# Patient Record
Sex: MALE | URBAN - METROPOLITAN AREA
[De-identification: names, ages, dates, MRNs, and addresses within clinical notes are randomized per-mention and may not be internally consistent; named-entity substitution may affect disease eponyms.]

---

## 2022-09-26 ENCOUNTER — APPOINTMENT (OUTPATIENT)
Dept: URBAN - METROPOLITAN AREA CLINIC 256 | Age: 87
Setting detail: DERMATOLOGY
End: 2022-09-26

## 2022-09-26 DIAGNOSIS — L57.0 ACTINIC KERATOSIS: ICD-10-CM

## 2022-09-26 DIAGNOSIS — D22 MELANOCYTIC NEVI: ICD-10-CM

## 2022-09-26 DIAGNOSIS — Z71.89 OTHER SPECIFIED COUNSELING: ICD-10-CM

## 2022-09-26 DIAGNOSIS — L82.1 OTHER SEBORRHEIC KERATOSIS: ICD-10-CM

## 2022-09-26 DIAGNOSIS — L57.8 OTHER SKIN CHANGES DUE TO CHRONIC EXPOSURE TO NONIONIZING RADIATION: ICD-10-CM

## 2022-09-26 DIAGNOSIS — D485 NEOPLASM OF UNCERTAIN BEHAVIOR OF SKIN: ICD-10-CM

## 2022-09-26 PROBLEM — D48.5 NEOPLASM OF UNCERTAIN BEHAVIOR OF SKIN: Status: ACTIVE | Noted: 2022-09-26

## 2022-09-26 PROBLEM — D22.5 MELANOCYTIC NEVI OF TRUNK: Status: ACTIVE | Noted: 2022-09-26

## 2022-09-26 PROCEDURE — OTHER COUNSELING: OTHER

## 2022-09-26 PROCEDURE — 17004 DESTROY PREMAL LESIONS 15/>: CPT

## 2022-09-26 PROCEDURE — OTHER BIOPSY BY SHAVE METHOD: OTHER

## 2022-09-26 PROCEDURE — 99213 OFFICE O/P EST LOW 20 MIN: CPT | Mod: 25

## 2022-09-26 PROCEDURE — OTHER MIPS QUALITY: OTHER

## 2022-09-26 PROCEDURE — 11102 TANGNTL BX SKIN SINGLE LES: CPT | Mod: 59

## 2022-09-26 PROCEDURE — OTHER LIQUID NITROGEN: OTHER

## 2022-09-26 ASSESSMENT — LOCATION ZONE DERM
LOCATION ZONE: SCALP
LOCATION ZONE: NECK
LOCATION ZONE: TRUNK
LOCATION ZONE: HAND
LOCATION ZONE: ARM
LOCATION ZONE: FACE

## 2022-09-26 ASSESSMENT — LOCATION DETAILED DESCRIPTION DERM
LOCATION DETAILED: LEFT RADIAL DORSAL HAND
LOCATION DETAILED: LEFT INFERIOR CENTRAL MALAR CHEEK
LOCATION DETAILED: RIGHT ULNAR DORSAL HAND
LOCATION DETAILED: RIGHT PROXIMAL POSTERIOR UPPER ARM
LOCATION DETAILED: RIGHT POSTERIOR SHOULDER
LOCATION DETAILED: LEFT CENTRAL FRONTAL SCALP
LOCATION DETAILED: LEFT CLAVICULAR NECK
LOCATION DETAILED: RIGHT CENTRAL FRONTAL SCALP
LOCATION DETAILED: RIGHT MID-UPPER BACK
LOCATION DETAILED: RIGHT SUPERIOR PARIETAL SCALP
LOCATION DETAILED: LEFT PROXIMAL POSTERIOR UPPER ARM
LOCATION DETAILED: POSTERIOR MID-PARIETAL SCALP
LOCATION DETAILED: LEFT SUPERIOR LATERAL UPPER BACK
LOCATION DETAILED: RIGHT INFERIOR CENTRAL MALAR CHEEK
LOCATION DETAILED: RIGHT RADIAL DORSAL HAND
LOCATION DETAILED: LEFT SUPERIOR UPPER BACK
LOCATION DETAILED: RIGHT ANTERIOR PROXIMAL UPPER ARM
LOCATION DETAILED: LEFT POSTERIOR SHOULDER
LOCATION DETAILED: RIGHT MEDIAL SUPERIOR CHEST
LOCATION DETAILED: RIGHT INFERIOR MEDIAL UPPER BACK
LOCATION DETAILED: LEFT SUPERIOR CENTRAL MALAR CHEEK
LOCATION DETAILED: LEFT MEDIAL SUPERIOR CHEST
LOCATION DETAILED: RIGHT SUPERIOR CENTRAL MALAR CHEEK

## 2022-09-26 ASSESSMENT — LOCATION SIMPLE DESCRIPTION DERM
LOCATION SIMPLE: RIGHT HAND
LOCATION SIMPLE: RIGHT SCALP
LOCATION SIMPLE: LEFT CHEEK
LOCATION SIMPLE: RIGHT UPPER ARM
LOCATION SIMPLE: LEFT UPPER ARM
LOCATION SIMPLE: LEFT SCALP
LOCATION SIMPLE: CHEST
LOCATION SIMPLE: RIGHT UPPER BACK
LOCATION SIMPLE: RIGHT CHEEK
LOCATION SIMPLE: LEFT HAND
LOCATION SIMPLE: POSTERIOR SCALP
LOCATION SIMPLE: RIGHT SHOULDER
LOCATION SIMPLE: SCALP
LOCATION SIMPLE: LEFT UPPER BACK
LOCATION SIMPLE: LEFT SHOULDER
LOCATION SIMPLE: LEFT ANTERIOR NECK

## 2022-10-04 ENCOUNTER — APPOINTMENT (OUTPATIENT)
Dept: URBAN - METROPOLITAN AREA CLINIC 256 | Age: 87
Setting detail: DERMATOLOGY
End: 2022-10-05

## 2022-10-04 DIAGNOSIS — Z85.828 PERSONAL HISTORY OF OTHER MALIGNANT NEOPLASM OF SKIN: ICD-10-CM

## 2022-10-04 DIAGNOSIS — Z85.820 PERSONAL HISTORY OF MALIGNANT MELANOMA OF SKIN: ICD-10-CM

## 2022-10-04 PROBLEM — C44.622 SQUAMOUS CELL CARCINOMA OF SKIN OF RIGHT UPPER LIMB, INCLUDING SHOULDER: Status: ACTIVE | Noted: 2022-10-04

## 2022-10-04 PROCEDURE — 17262 DSTRJ MAL LES T/A/L 1.1-2.0: CPT | Mod: 79

## 2022-10-04 PROCEDURE — OTHER CURETTAGE: OTHER

## 2022-10-04 PROCEDURE — OTHER COUNSELING: OTHER

## 2022-10-04 PROCEDURE — OTHER MIPS QUALITY: OTHER

## 2022-10-04 ASSESSMENT — LOCATION ZONE DERM: LOCATION ZONE: TRUNK

## 2022-10-04 ASSESSMENT — LOCATION DETAILED DESCRIPTION DERM
LOCATION DETAILED: LEFT MID-UPPER BACK
LOCATION DETAILED: RIGHT INFERIOR MEDIAL UPPER BACK
LOCATION DETAILED: RIGHT SUPERIOR MEDIAL MIDBACK

## 2022-10-04 ASSESSMENT — LOCATION SIMPLE DESCRIPTION DERM
LOCATION SIMPLE: LEFT UPPER BACK
LOCATION SIMPLE: RIGHT UPPER BACK
LOCATION SIMPLE: RIGHT LOWER BACK

## 2023-03-21 ENCOUNTER — APPOINTMENT (OUTPATIENT)
Dept: URBAN - METROPOLITAN AREA CLINIC 256 | Age: 88
Setting detail: DERMATOLOGY
End: 2023-03-21

## 2023-03-21 VITALS — HEIGHT: 66 IN

## 2023-03-21 DIAGNOSIS — L82.0 INFLAMED SEBORRHEIC KERATOSIS: ICD-10-CM

## 2023-03-21 DIAGNOSIS — D22 MELANOCYTIC NEVI: ICD-10-CM

## 2023-03-21 DIAGNOSIS — D18.0 HEMANGIOMA: ICD-10-CM

## 2023-03-21 DIAGNOSIS — Z71.89 OTHER SPECIFIED COUNSELING: ICD-10-CM

## 2023-03-21 DIAGNOSIS — L82.1 OTHER SEBORRHEIC KERATOSIS: ICD-10-CM

## 2023-03-21 DIAGNOSIS — L57.8 OTHER SKIN CHANGES DUE TO CHRONIC EXPOSURE TO NONIONIZING RADIATION: ICD-10-CM

## 2023-03-21 DIAGNOSIS — L57.0 ACTINIC KERATOSIS: ICD-10-CM

## 2023-03-21 PROBLEM — D18.01 HEMANGIOMA OF SKIN AND SUBCUTANEOUS TISSUE: Status: ACTIVE | Noted: 2023-03-21

## 2023-03-21 PROBLEM — D22.5 MELANOCYTIC NEVI OF TRUNK: Status: ACTIVE | Noted: 2023-03-21

## 2023-03-21 PROCEDURE — OTHER COUNSELING: OTHER

## 2023-03-21 PROCEDURE — 17110 DESTRUCT B9 LESION 1-14: CPT

## 2023-03-21 PROCEDURE — OTHER LIQUID NITROGEN: OTHER

## 2023-03-21 PROCEDURE — OTHER MIPS QUALITY: OTHER

## 2023-03-21 PROCEDURE — 99213 OFFICE O/P EST LOW 20 MIN: CPT | Mod: 25

## 2023-03-21 PROCEDURE — 17003 DESTRUCT PREMALG LES 2-14: CPT | Mod: 59

## 2023-03-21 PROCEDURE — 17000 DESTRUCT PREMALG LESION: CPT | Mod: 59

## 2023-03-21 ASSESSMENT — LOCATION ZONE DERM
LOCATION ZONE: FACE
LOCATION ZONE: ARM
LOCATION ZONE: HAND
LOCATION ZONE: EAR
LOCATION ZONE: LIP
LOCATION ZONE: TRUNK
LOCATION ZONE: SCALP

## 2023-03-21 ASSESSMENT — LOCATION DETAILED DESCRIPTION DERM
LOCATION DETAILED: RIGHT FOREHEAD
LOCATION DETAILED: LEFT POSTERIOR SHOULDER
LOCATION DETAILED: RIGHT INFERIOR UPPER BACK
LOCATION DETAILED: RIGHT SCAPHA
LOCATION DETAILED: RIGHT NASOLABIAL FOLD
LOCATION DETAILED: LEFT SUPERIOR FRONTAL SCALP
LOCATION DETAILED: LEFT DORSAL RING METACARPOPHALANGEAL JOINT
LOCATION DETAILED: LEFT SUPERIOR OCCIPITAL SCALP
LOCATION DETAILED: LEFT MEDIAL FOREHEAD
LOCATION DETAILED: RIGHT POSTERIOR SHOULDER
LOCATION DETAILED: RIGHT INFERIOR MEDIAL UPPER BACK
LOCATION DETAILED: LEFT SUPERIOR HELIX
LOCATION DETAILED: LEFT SUPERIOR PARIETAL SCALP
LOCATION DETAILED: LEFT LATERAL UPPER BACK
LOCATION DETAILED: LEFT FOREHEAD
LOCATION DETAILED: RIGHT MID-UPPER BACK
LOCATION DETAILED: RIGHT SUPERIOR OCCIPITAL SCALP

## 2023-03-21 ASSESSMENT — LOCATION SIMPLE DESCRIPTION DERM
LOCATION SIMPLE: RIGHT OCCIPITAL SCALP
LOCATION SIMPLE: LEFT EAR
LOCATION SIMPLE: RIGHT EAR
LOCATION SIMPLE: RIGHT UPPER BACK
LOCATION SIMPLE: LEFT FOREHEAD
LOCATION SIMPLE: RIGHT LIP
LOCATION SIMPLE: RIGHT FOREHEAD
LOCATION SIMPLE: RIGHT SHOULDER
LOCATION SIMPLE: SCALP
LOCATION SIMPLE: LEFT OCCIPITAL SCALP
LOCATION SIMPLE: LEFT SHOULDER
LOCATION SIMPLE: LEFT UPPER BACK
LOCATION SIMPLE: LEFT HAND

## 2023-11-08 ENCOUNTER — APPOINTMENT (OUTPATIENT)
Dept: URBAN - METROPOLITAN AREA CLINIC 256 | Age: 88
Setting detail: DERMATOLOGY
End: 2023-11-09

## 2023-11-08 VITALS — HEIGHT: 67 IN | WEIGHT: 160 LBS

## 2023-11-08 DIAGNOSIS — L57.8 OTHER SKIN CHANGES DUE TO CHRONIC EXPOSURE TO NONIONIZING RADIATION: ICD-10-CM

## 2023-11-08 DIAGNOSIS — D49.2 NEOPLASM OF UNSPECIFIED BEHAVIOR OF BONE, SOFT TISSUE, AND SKIN: ICD-10-CM

## 2023-11-08 DIAGNOSIS — L57.0 ACTINIC KERATOSIS: ICD-10-CM

## 2023-11-08 PROCEDURE — OTHER BIOPSY BY SHAVE METHOD: OTHER

## 2023-11-08 PROCEDURE — OTHER SEPARATE AND IDENTIFIABLE DOCUMENTATION: OTHER

## 2023-11-08 PROCEDURE — OTHER LIQUID NITROGEN: OTHER

## 2023-11-08 PROCEDURE — 11102 TANGNTL BX SKIN SINGLE LES: CPT

## 2023-11-08 PROCEDURE — OTHER MIPS QUALITY: OTHER

## 2023-11-08 PROCEDURE — OTHER COUNSELING: OTHER

## 2023-11-08 PROCEDURE — 99213 OFFICE O/P EST LOW 20 MIN: CPT | Mod: 25

## 2023-11-08 PROCEDURE — 17003 DESTRUCT PREMALG LES 2-14: CPT

## 2023-11-08 PROCEDURE — 17000 DESTRUCT PREMALG LESION: CPT | Mod: 59

## 2023-11-08 ASSESSMENT — LOCATION SIMPLE DESCRIPTION DERM
LOCATION SIMPLE: LEFT SCALP
LOCATION SIMPLE: LEFT FOREHEAD
LOCATION SIMPLE: LEFT ZYGOMA
LOCATION SIMPLE: SUPERIOR FOREHEAD
LOCATION SIMPLE: RIGHT EYEBROW
LOCATION SIMPLE: LEFT TEMPLE
LOCATION SIMPLE: RIGHT FOREHEAD
LOCATION SIMPLE: SCALP
LOCATION SIMPLE: NOSE
LOCATION SIMPLE: LEFT HAND

## 2023-11-08 ASSESSMENT — LOCATION ZONE DERM
LOCATION ZONE: FACE
LOCATION ZONE: HAND
LOCATION ZONE: NOSE
LOCATION ZONE: SCALP

## 2023-11-08 ASSESSMENT — LOCATION DETAILED DESCRIPTION DERM
LOCATION DETAILED: NASAL SUPRATIP
LOCATION DETAILED: RIGHT SUPERIOR PARIETAL SCALP
LOCATION DETAILED: RIGHT LATERAL EYEBROW
LOCATION DETAILED: 1ST WEB SPACE LEFT HAND
LOCATION DETAILED: NASAL ROOT
LOCATION DETAILED: LEFT LATERAL TEMPLE
LOCATION DETAILED: LEFT MEDIAL ZYGOMA
LOCATION DETAILED: SUPERIOR MID FOREHEAD
LOCATION DETAILED: RIGHT SUPERIOR MEDIAL FOREHEAD
LOCATION DETAILED: LEFT INFERIOR FOREHEAD
LOCATION DETAILED: LEFT CENTRAL FRONTAL SCALP

## 2023-11-21 ENCOUNTER — APPOINTMENT (OUTPATIENT)
Dept: URBAN - METROPOLITAN AREA CLINIC 256 | Age: 88
Setting detail: DERMATOLOGY
End: 2023-11-21

## 2023-11-21 VITALS — HEIGHT: 66 IN | WEIGHT: 145 LBS

## 2023-11-21 DIAGNOSIS — D49.2 NEOPLASM OF UNSPECIFIED BEHAVIOR OF BONE, SOFT TISSUE, AND SKIN: ICD-10-CM

## 2023-11-21 PROBLEM — C44.629 SQUAMOUS CELL CARCINOMA OF SKIN OF LEFT UPPER LIMB, INCLUDING SHOULDER: Status: ACTIVE | Noted: 2023-11-21

## 2023-11-21 PROCEDURE — 99213 OFFICE O/P EST LOW 20 MIN: CPT | Mod: 25

## 2023-11-21 PROCEDURE — OTHER EDUCATIONAL RESOURCES PROVIDED: OTHER

## 2023-11-21 PROCEDURE — OTHER BIOPSY BY SHAVE METHOD: OTHER

## 2023-11-21 PROCEDURE — 17270 DSTR MAL LES S/N/H/F/G .5 /<: CPT

## 2023-11-21 PROCEDURE — OTHER PHOTO-DOCUMENTATION: OTHER

## 2023-11-21 PROCEDURE — OTHER SEPARATE AND IDENTIFIABLE DOCUMENTATION: OTHER

## 2023-11-21 PROCEDURE — OTHER CURETTAGE AND DESTRUCTION: OTHER

## 2023-11-21 PROCEDURE — 11102 TANGNTL BX SKIN SINGLE LES: CPT | Mod: 59

## 2023-11-21 PROCEDURE — OTHER COUNSELING: OTHER

## 2023-11-21 PROCEDURE — OTHER MIPS QUALITY: OTHER

## 2023-11-21 ASSESSMENT — LOCATION ZONE DERM: LOCATION ZONE: TRUNK

## 2023-11-21 ASSESSMENT — LOCATION SIMPLE DESCRIPTION DERM: LOCATION SIMPLE: LEFT UPPER BACK

## 2023-11-21 ASSESSMENT — LOCATION DETAILED DESCRIPTION DERM: LOCATION DETAILED: LEFT SUPERIOR MEDIAL UPPER BACK

## 2023-12-05 ENCOUNTER — APPOINTMENT (OUTPATIENT)
Dept: URBAN - METROPOLITAN AREA CLINIC 256 | Age: 88
Setting detail: DERMATOLOGY
End: 2023-12-05

## 2023-12-05 DIAGNOSIS — D49.2 NEOPLASM OF UNSPECIFIED BEHAVIOR OF BONE, SOFT TISSUE, AND SKIN: ICD-10-CM

## 2023-12-05 PROBLEM — C44.529 SQUAMOUS CELL CARCINOMA OF SKIN OF OTHER PART OF TRUNK: Status: ACTIVE | Noted: 2023-12-05

## 2023-12-05 PROCEDURE — 17262 DSTRJ MAL LES T/A/L 1.1-2.0: CPT

## 2023-12-05 PROCEDURE — OTHER SEPARATE AND IDENTIFIABLE DOCUMENTATION: OTHER

## 2023-12-05 PROCEDURE — 11103 TANGNTL BX SKIN EA SEP/ADDL: CPT

## 2023-12-05 PROCEDURE — OTHER CURETTAGE AND DESTRUCTION: OTHER

## 2023-12-05 PROCEDURE — OTHER PATIENT SPECIFIC COUNSELING: OTHER

## 2023-12-05 PROCEDURE — OTHER COUNSELING: OTHER

## 2023-12-05 PROCEDURE — 11102 TANGNTL BX SKIN SINGLE LES: CPT | Mod: 59

## 2023-12-05 PROCEDURE — OTHER BIOPSY BY SHAVE METHOD: OTHER

## 2023-12-05 PROCEDURE — OTHER PHOTO-DOCUMENTATION: OTHER

## 2023-12-05 PROCEDURE — 99213 OFFICE O/P EST LOW 20 MIN: CPT | Mod: 25

## 2023-12-05 PROCEDURE — OTHER EDUCATIONAL RESOURCES PROVIDED: OTHER

## 2023-12-05 PROCEDURE — OTHER MIPS QUALITY: OTHER

## 2023-12-05 ASSESSMENT — LOCATION ZONE DERM
LOCATION ZONE: NECK
LOCATION ZONE: ARM

## 2023-12-05 ASSESSMENT — LOCATION SIMPLE DESCRIPTION DERM
LOCATION SIMPLE: LEFT UPPER ARM
LOCATION SIMPLE: POSTERIOR NECK

## 2023-12-05 ASSESSMENT — LOCATION DETAILED DESCRIPTION DERM
LOCATION DETAILED: RIGHT LATERAL TRAPEZIAL NECK
LOCATION DETAILED: LEFT LATERAL DISTAL UPPER ARM

## 2023-12-19 ENCOUNTER — APPOINTMENT (OUTPATIENT)
Dept: URBAN - METROPOLITAN AREA CLINIC 256 | Age: 88
Setting detail: DERMATOLOGY
End: 2023-12-19

## 2023-12-19 VITALS — HEIGHT: 66.5 IN

## 2023-12-19 DIAGNOSIS — L57.8 OTHER SKIN CHANGES DUE TO CHRONIC EXPOSURE TO NONIONIZING RADIATION: ICD-10-CM

## 2023-12-19 PROBLEM — C44.42 SQUAMOUS CELL CARCINOMA OF SKIN OF SCALP AND NECK: Status: ACTIVE | Noted: 2023-12-19

## 2023-12-19 PROBLEM — C44.629 SQUAMOUS CELL CARCINOMA OF SKIN OF LEFT UPPER LIMB, INCLUDING SHOULDER: Status: ACTIVE | Noted: 2023-12-19

## 2023-12-19 PROCEDURE — OTHER CURETTAGE AND DESTRUCTION: OTHER

## 2023-12-19 PROCEDURE — OTHER PATIENT SPECIFIC COUNSELING: OTHER

## 2023-12-19 PROCEDURE — OTHER COUNSELING: OTHER

## 2023-12-19 PROCEDURE — 17262 DSTRJ MAL LES T/A/L 1.1-2.0: CPT

## 2023-12-19 PROCEDURE — 17272 DSTR MAL LES S/N/H/F/G 1.1-2: CPT

## 2023-12-19 PROCEDURE — OTHER MIPS QUALITY: OTHER

## 2023-12-19 PROCEDURE — 99212 OFFICE O/P EST SF 10 MIN: CPT | Mod: 25

## 2024-01-08 ENCOUNTER — APPOINTMENT (OUTPATIENT)
Dept: URBAN - METROPOLITAN AREA CLINIC 256 | Age: 89
Setting detail: DERMATOLOGY
End: 2024-01-09

## 2024-01-08 DIAGNOSIS — L57.8 OTHER SKIN CHANGES DUE TO CHRONIC EXPOSURE TO NONIONIZING RADIATION: ICD-10-CM

## 2024-01-08 DIAGNOSIS — D49.2 NEOPLASM OF UNSPECIFIED BEHAVIOR OF BONE, SOFT TISSUE, AND SKIN: ICD-10-CM

## 2024-01-08 DIAGNOSIS — L82.1 OTHER SEBORRHEIC KERATOSIS: ICD-10-CM

## 2024-01-08 DIAGNOSIS — D22 MELANOCYTIC NEVI: ICD-10-CM

## 2024-01-08 DIAGNOSIS — D18.0 HEMANGIOMA: ICD-10-CM

## 2024-01-08 PROBLEM — D22.61 MELANOCYTIC NEVI OF RIGHT UPPER LIMB, INCLUDING SHOULDER: Status: ACTIVE | Noted: 2024-01-08

## 2024-01-08 PROBLEM — D22.5 MELANOCYTIC NEVI OF TRUNK: Status: ACTIVE | Noted: 2024-01-08

## 2024-01-08 PROBLEM — D22.62 MELANOCYTIC NEVI OF LEFT UPPER LIMB, INCLUDING SHOULDER: Status: ACTIVE | Noted: 2024-01-08

## 2024-01-08 PROBLEM — D18.01 HEMANGIOMA OF SKIN AND SUBCUTANEOUS TISSUE: Status: ACTIVE | Noted: 2024-01-08

## 2024-01-08 PROBLEM — D22.39 MELANOCYTIC NEVI OF OTHER PARTS OF FACE: Status: ACTIVE | Noted: 2024-01-08

## 2024-01-08 PROCEDURE — OTHER COUNSELING: OTHER

## 2024-01-08 PROCEDURE — 99213 OFFICE O/P EST LOW 20 MIN: CPT | Mod: 25

## 2024-01-08 PROCEDURE — OTHER SEPARATE AND IDENTIFIABLE DOCUMENTATION: OTHER

## 2024-01-08 PROCEDURE — OTHER MIPS QUALITY: OTHER

## 2024-01-08 PROCEDURE — 11102 TANGNTL BX SKIN SINGLE LES: CPT

## 2024-01-08 PROCEDURE — 11103 TANGNTL BX SKIN EA SEP/ADDL: CPT

## 2024-01-08 PROCEDURE — OTHER BIOPSY BY SHAVE METHOD: OTHER

## 2024-01-08 ASSESSMENT — LOCATION SIMPLE DESCRIPTION DERM
LOCATION SIMPLE: LEFT SHOULDER
LOCATION SIMPLE: LEFT FOREARM
LOCATION SIMPLE: LEFT UPPER ARM
LOCATION SIMPLE: RIGHT LOWER BACK
LOCATION SIMPLE: RIGHT FOREARM
LOCATION SIMPLE: UPPER BACK
LOCATION SIMPLE: LEFT UPPER BACK
LOCATION SIMPLE: LEFT CHEEK

## 2024-01-08 ASSESSMENT — LOCATION ZONE DERM
LOCATION ZONE: FACE
LOCATION ZONE: TRUNK
LOCATION ZONE: ARM

## 2024-01-08 ASSESSMENT — LOCATION DETAILED DESCRIPTION DERM
LOCATION DETAILED: LEFT VENTRAL PROXIMAL FOREARM
LOCATION DETAILED: RIGHT VENTRAL PROXIMAL FOREARM
LOCATION DETAILED: LEFT ANTECUBITAL SKIN
LOCATION DETAILED: SUPERIOR THORACIC SPINE
LOCATION DETAILED: LEFT INFERIOR CENTRAL MALAR CHEEK
LOCATION DETAILED: LEFT SUPERIOR MEDIAL UPPER BACK
LOCATION DETAILED: LEFT INFERIOR LATERAL MALAR CHEEK
LOCATION DETAILED: RIGHT SUPERIOR MEDIAL MIDBACK
LOCATION DETAILED: LEFT POSTERIOR SHOULDER
LOCATION DETAILED: LEFT CENTRAL MALAR CHEEK

## 2024-01-16 ENCOUNTER — APPOINTMENT (OUTPATIENT)
Dept: URBAN - METROPOLITAN AREA CLINIC 256 | Age: 89
Setting detail: DERMATOLOGY
End: 2024-01-16

## 2024-01-16 VITALS — WEIGHT: 180 LBS | HEIGHT: 66 IN

## 2024-01-16 PROBLEM — D04.62 CARCINOMA IN SITU OF SKIN OF LEFT UPPER LIMB, INCLUDING SHOULDER: Status: ACTIVE | Noted: 2024-01-16

## 2024-01-16 PROBLEM — D04.5 CARCINOMA IN SITU OF SKIN OF TRUNK: Status: ACTIVE | Noted: 2024-01-16

## 2024-01-16 PROCEDURE — OTHER CURETTAGE AND DESTRUCTION: OTHER

## 2024-01-16 PROCEDURE — OTHER COUNSELING: OTHER

## 2024-01-16 PROCEDURE — OTHER MIPS QUALITY: OTHER

## 2024-01-16 PROCEDURE — OTHER SEPARATE AND IDENTIFIABLE DOCUMENTATION: OTHER

## 2024-01-16 PROCEDURE — 99212 OFFICE O/P EST SF 10 MIN: CPT | Mod: 25

## 2024-01-16 PROCEDURE — 17262 DSTRJ MAL LES T/A/L 1.1-2.0: CPT

## 2024-01-16 PROCEDURE — 17262 DSTRJ MAL LES T/A/L 1.1-2.0: CPT | Mod: 76

## 2024-01-16 PROCEDURE — OTHER PHOTO-DOCUMENTATION: OTHER

## 2024-04-30 ENCOUNTER — DOCUMENTATION ONLY (OUTPATIENT)
Dept: GERIATRICS | Facility: CLINIC | Age: 89
End: 2024-04-30

## 2024-04-30 ENCOUNTER — DOCUMENTATION ONLY (OUTPATIENT)
Dept: OTHER | Facility: CLINIC | Age: 89
End: 2024-04-30

## 2024-05-03 ENCOUNTER — TELEPHONE (OUTPATIENT)
Dept: GERIATRICS | Facility: CLINIC | Age: 89
End: 2024-05-03

## 2024-05-03 RX ORDER — OLANZAPINE 10 MG/1
5 TABLET ORAL AT BEDTIME
COMMUNITY
Start: 2024-05-03 | End: 2024-09-23

## 2024-05-03 NOTE — TELEPHONE ENCOUNTER
Central Prior Authorization Team - Phone: [phone]     Hi, There seems to be no active Rx on patient's chart for this Prior Authorization request?  Unfortunately, Central PA Team would need an Active Rx on file for this medication before can initiate PA request.  Insurance companies require medication SIG, Qty, ICD10 diagnosis code(s) along with prescribing Provider's NPI number.  Please ask provider to add Rx to file, then can route back to Central Prior Authorization Team or myself, and we will initiate PA request for you.  Thank you.

## 2024-05-03 NOTE — TELEPHONE ENCOUNTER
Writer notified that patient's Olanzapine ODT tab is not covered by insurance.  Insurance will only cover 1.5 tabs per day.      New orders per Charmaine Storm CNP:  Discontinue Olanzapine 5mg ODT tablet.  Start regular release Olanzapine 10mg tablet----give 0.5 tabs(5mg) Q HS and 0.5 tabs(5mg) BID PRN.        New orders from NP were sent by NP to the assisted living facility.        Sudeep Fay RN

## 2024-05-03 NOTE — TELEPHONE ENCOUNTER
Prior Authorization Retail Medication Request    Medication/Dose: OLANZapine (ZYPREXA) 5 MG tablet  Diagnosis and ICD code (if different than what is on RX):    New/renewal/insurance change PA/secondary ins. PA:  Previously Tried and Failed:    Rationale:      Insurance   Primary: [employer] MEDICARE  Insurance ID:  [id number]    Secondary (if applicable):  Insurance ID:      Pharmacy Information (if different than what is on RX)  Name:    Phone:    Fax:

## 2024-05-06 ENCOUNTER — DOCUMENTATION ONLY (OUTPATIENT)
Dept: GERIATRICS | Facility: CLINIC | Age: 89
End: 2024-05-06
Payer: MEDICARE

## 2024-05-06 PROCEDURE — G0180 MD CERTIFICATION HHA PATIENT: HCPCS | Performed by: NURSE PRACTITIONER

## 2024-05-13 DIAGNOSIS — Z53.9 DIAGNOSIS NOT YET DEFINED: Primary | ICD-10-CM

## 2024-05-15 ENCOUNTER — ASSISTED LIVING VISIT (OUTPATIENT)
Dept: GERIATRICS | Facility: CLINIC | Age: 89
End: 2024-05-15
Payer: MEDICARE

## 2024-05-15 VITALS
RESPIRATION RATE: 18 BRPM | HEART RATE: 85 BPM | DIASTOLIC BLOOD PRESSURE: 68 MMHG | TEMPERATURE: 97.5 F | WEIGHT: 177 LBS | SYSTOLIC BLOOD PRESSURE: 121 MMHG

## 2024-05-15 DIAGNOSIS — I48.91 ATRIAL FIBRILLATION, UNSPECIFIED TYPE (H): Primary | ICD-10-CM

## 2024-05-15 DIAGNOSIS — I10 BENIGN ESSENTIAL HYPERTENSION: ICD-10-CM

## 2024-05-15 DIAGNOSIS — G47.00 INSOMNIA, UNSPECIFIED TYPE: ICD-10-CM

## 2024-05-15 DIAGNOSIS — F02.811 LATE ONSET ALZHEIMER'S DEMENTIA WITH AGITATION, UNSPECIFIED DEMENTIA SEVERITY (H): ICD-10-CM

## 2024-05-15 DIAGNOSIS — Z87.09 HISTORY OF PULMONARY EDEMA: ICD-10-CM

## 2024-05-15 DIAGNOSIS — G30.1 LATE ONSET ALZHEIMER'S DEMENTIA WITH AGITATION, UNSPECIFIED DEMENTIA SEVERITY (H): ICD-10-CM

## 2024-05-15 PROCEDURE — 99344 HOME/RES VST NEW MOD MDM 60: CPT | Performed by: NURSE PRACTITIONER

## 2024-05-15 RX ORDER — AMLODIPINE BESYLATE 5 MG/1
5 TABLET ORAL DAILY
COMMUNITY

## 2024-05-15 RX ORDER — IPRATROPIUM BROMIDE AND ALBUTEROL SULFATE 2.5; .5 MG/3ML; MG/3ML
1 SOLUTION RESPIRATORY (INHALATION) 4 TIMES DAILY PRN
COMMUNITY

## 2024-05-15 RX ORDER — SENNOSIDES 8.6 MG
650 CAPSULE ORAL EVERY 8 HOURS PRN
COMMUNITY

## 2024-05-15 RX ORDER — DONEPEZIL HYDROCHLORIDE 10 MG/1
10 TABLET, FILM COATED ORAL AT BEDTIME
COMMUNITY

## 2024-05-15 RX ORDER — AMIODARONE HYDROCHLORIDE 200 MG/1
100 TABLET ORAL DAILY
COMMUNITY
End: 2024-09-23

## 2024-05-15 RX ORDER — ZINC GLUCONATE 50 MG
50 TABLET ORAL DAILY
COMMUNITY

## 2024-05-15 RX ORDER — MULTIVIT WITH MINERALS/LUTEIN
1000 TABLET ORAL DAILY
COMMUNITY

## 2024-05-15 RX ORDER — LISINOPRIL 20 MG/1
20 TABLET ORAL DAILY
COMMUNITY

## 2024-05-15 RX ORDER — VITAMIN B COMPLEX
3 TABLET ORAL DAILY
COMMUNITY

## 2024-05-15 RX ORDER — BENZONATATE 100 MG/1
100 CAPSULE ORAL 3 TIMES DAILY PRN
COMMUNITY
End: 2024-05-27

## 2024-05-15 RX ORDER — LANOLIN ALCOHOL/MO/W.PET/CERES
3 CREAM (GRAM) TOPICAL AT BEDTIME
COMMUNITY

## 2024-05-15 NOTE — PROGRESS NOTES
[facility] GERIATRICS  PRIMARY CARE PROVIDER AND CLINIC:  Charmaine Storm, LAKESHIA CNP, [address] [address]  Chief Complaint   Patient presents with    [facility] Medical Record Number:  [id number]  Place of Service where encounter took place:  [facility] ([facility]) [[id number]]    Reji Cortes Jr.  is a 89 year old  (1935), admitted to the above facility from [facility] 4/19/24 through 4/26/24 following hospitalization at [facility] 4/5/24 through 4/19/24.    HPI:    This is an 89-year-old male, with a past medical history significant for Alzheimer's Dementia, hypertension, melanoma of the back, prostate cancer, colon cancer s/p partial colectomy 1990, lymphocytic colitis s/p partial colectomy 1990, and osteoarthritis s/p right hip replacement 2/5/13, who was initially evaluated at [facility] ED with transfer to [facility] 4/5/24 through 4/19/24 for weakness after a fall. Parainfluenza positive. Labs also revealed elevated CK, Troponin, and pro-BNP. IV fluids administered. Found to have atrial fibrillation with RVR. Anticoagulation was not initiated as risk outweigh benefit. Significant wheezing and worsening cough noted after initiation of Metoprolol so this was discontinued. Became hypoxic 4/8/24 and chest x-ray revealed pulmonary edema. IV fluids discontinued. IV diurectic administered with improvement. Started on Olanzapine for agitation and sundowning. Physical rehabilitation was recommended at a TCU. Admitted to [facility] on the memory care unit 4/26/24.    Today, daughter is visiting and going through pictures with patient. Grew up in [place]. Has 2 daughters. Ran a small business. Enjoys listening to country Oony music and country Oony books. Enjoys fishing and travel. Would describe himself as healthy overall.     CODE STATUS/ADVANCE DIRECTIVES DISCUSSION:  No  CPR- Do NOT Intubate    ALLERGIES:   Allergies   Allergen Reactions    Contrast Dye     Hydrocodone-Acetaminophen       PAST MEDICAL HISTORY:   Past Medical History:   Diagnosis Date    Alzheimer's dementia (H)     Colon cancer (H) 1990    s/p partial colectomy    Essential tremor     GERD (gastroesophageal reflux disease)     Gilbert syndrome     History of rib fracture     right chest    History of skin cancer     head, arms, and ear    HTN (hypertension)     Insomnia     Melanoma of back (H)     Osteoarthritis     Prostate cancer (H)     s/p seed implantation      PAST SURGICAL HISTORY:   has a past surgical history that includes Colectomy Partial (1990); hernia repair (1953); tonsillectomy & adenoidectomy; colonoscopy; NM Prostate Implant Therapy w Pd 103 Seeds (2008); REPAIR HALLUX RIGIDUS; joint replacement (Right, 02/05/2013); Cataract Extraction (Right, 10/22/2013); Cataract Extraction (Left, 11/11/2013); and Surgical Pathology Exam (Right, 02/20/2018).  FAMILY HISTORY: family history includes Asthma in his sister; Cerebrovascular Disease in his mother; Ovarian Cancer in his sister; Seizure Disorder in his sister; Substance Abuse in his father.  SOCIAL HISTORY:   reports that he quit smoking about 59 years ago. His smoking use included pipe. He does not have any smokeless tobacco history on file. He reports that he does not currently use alcohol.  Patient's living condition: lives in an assisted living facility    Post Discharge Medication Reconciliation Status:   MED REC REQUIRED  Post Medication Reconciliation Status: discharge medications reconciled, continue medications without change     Current Outpatient Medications   Medication Sig Dispense Refill    acetaminophen (TYLENOL 8 HOUR ARTHRITIS PAIN) 650 MG CR tablet Take 650 mg by mouth every 8 hours as needed for mild pain or fever      amiodarone (PACERONE) 200 MG tablet Take 200 mg by mouth daily      amLODIPine (NORVASC) 5 MG tablet Take 5 mg by  mouth daily      benzonatate (TESSALON) 100 MG capsule Take 100 mg by mouth 3 times daily as needed for cough      donepezil (ARICEPT) 10 MG tablet Take 10 mg by mouth at bedtime      ipratropium - albuterol 0.5 mg/2.5 mg/3 mL (DUONEB) 0.5-2.5 (3) MG/3ML neb solution Take 1 vial by nebulization 4 times daily as needed for shortness of breath, wheezing or cough      lisinopril (ZESTRIL) 20 MG tablet Take 20 mg by mouth daily      melatonin 3 MG tablet Take 3 mg by mouth at bedtime      OLANZapine (ZYPREXA) 10 MG tablet Take 5 mg by mouth at bedtime AND 5 mg BID PRN      Selenium 200 MCG TABS tablet Take 200 mcg by mouth daily      Vitamin D3 (CHOLECALCIFEROL) 25 mcg (1000 units) tablet Take 3 tablets by mouth daily      vitamin E (TOCOPHEROL) 1000 units (450 mg) CAPS capsule Take 1,000 Units by mouth daily      zinc gluconate 50 MG tablet Take 50 mg by mouth daily       No current facility-administered medications for this visit.     ROS:  4 point ROS including Respiratory, CV, GI and , other than that noted in the HPI,  is negative    Vitals:  /68   Pulse 85   Temp 97.5  F (36.4  C)   Resp 18   Wt 80.3 kg (177 lb)   Exam:  GENERAL APPEARANCE:  Alert, in no distress  ENT:  Mouth and posterior oropharynx normal, moist mucous membranes  EYES:  EOM, conjunctivae, lids, pupils and irises normal  RESP:  respiratory effort and palpation of chest normal, lungs clear to auscultation , no respiratory distress  CV:  Palpation and auscultation of heart done , regular rate and rhythm, no murmur, rub, or gallop  ABDOMEN:  normal bowel sounds, soft, nontender, no hepatosplenomegaly or other masses  M/S:   No edema in BLE  SKIN:  Inspection of skin and subcutaneous tissue baseline, Palpation of skin and subcutaneous tissue baseline  NEURO:   Cranial nerves 2-12 are normal tested and grossly at patient's baseline  PSYCH:  memory impaired     Lab/Diagnostic data:  Labs done in SNF are in [place] EPIC. Please refer to  them using EPIC/Care Everywhere.    ASSESSMENT/PLAN:  Alzheimer's Dementia. Started on Olanzapine during hospitalization due to agitation and sundowning. Lives with wife prior to hospitalization. Now resides on secure memory care unit. Able to make needs known. Staff to assist with ADLs, meals, and medications. Takes Donepezil.     Atrial Fibrillation with RVR. Noted during most recent hospitalization. Risks outweigh benefits of anticoagulation. Unable to tolerate Metoprolol. Started on Amiodarone. Monitor heart rate.    Hypertension. Monitor blood pressure. Continue Amlodipine and Lisinopril as ordered.    History of Pulmonary Edema. Noted during hospitalization after receiving IV fluids. Resolved with IV diuretics. Order for DuoNeb, but question if patient has nebulizer machine. Will need to follow-up at future visit. Will discontinue Benzonatate due to non-use.    History of Prostate Cancer S/P Brachytherapy 2008, History of Rectal Cancer S/P Low Anterior Resection 1990 with Positive Lymph Nodes, and History of Recurrent Melanoma. Noted in history.     Insomnia. Continue Melatonin as ordered.    Orders:  Discontinue Benzonatate    Electronically signed by:  LAKESHIA Douglass CNP

## 2024-05-15 NOTE — LETTER
5/15/2024        RE: Reji Cortes Jr.  [address] [place] [address]        [facility] GERIATRICS  PRIMARY CARE PROVIDER AND CLINIC:  Charmaine Storm, LAKESHIA CNP, [address] [facility] [address]  Chief Complaint   Patient presents with     [facility] Medical Record Number:  [id number]  Place of Service where encounter took place:  [facility] ([facility]) [[id number]]    Reji Cortes Jr.  is a 89 year old  (1935), [facility] the above facility [facility] through 4/26/24 following hospitalization at [facility] 4/5/24 through 4/19/24.    HPI:    This is an 89-year-old male, with a past medical history significant for Alzheimer's Dementia, hypertension, melanoma of the back, prostate cancer, colon cancer s/p partial colectomy 1990, lymphocytic colitis s/p partial colectomy 1990, and osteoarthritis s/p right hip replacement 2/5/13, who was initially evaluated at [facility] with transfer to [facility] 4/5/24 through 4/19/24 for weakness after a fall. Parainfluenza positive. Labs also revealed elevated CK, Troponin, and pro-BNP. IV fluids administered. Found to have atrial fibrillation with RVR. Anticoagulation was not initiated as risk outweigh benefit. Significant wheezing and worsening cough noted after initiation of Metoprolol so this was discontinued. Became hypoxic 4/8/24 and chest x-ray revealed pulmonary edema. IV fluids discontinued. IV diurectic administered with improvement. Started on Olanzapine for agitation and sundowning. Physical rehabilitation was recommended at a TCU. Admitted to [facility] on the memory care unit 4/26/24.    Today, daughter is visiting and going through pictures with patient. Grew up in [place]. Has 2 daughters. Ran a small business. Enjoys listening to country western music and country western books. Enjoys fishing and  travel. Would describe himself as healthy overall.     CODE STATUS/ADVANCE DIRECTIVES DISCUSSION:  No CPR- Do NOT Intubate    ALLERGIES:   Allergies   Allergen Reactions     Contrast Dye      Hydrocodone-Acetaminophen       PAST MEDICAL HISTORY:   Past Medical History:   Diagnosis Date     Alzheimer's dementia (H)      Colon cancer (H) 1990    s/p partial colectomy     Essential tremor      GERD (gastroesophageal reflux disease)      Gilbert syndrome      History of rib fracture     right chest     History of skin cancer     head, arms, and ear     HTN (hypertension)      Insomnia      Melanoma of back (H)      Osteoarthritis      Prostate cancer (H)     s/p seed implantation      PAST SURGICAL HISTORY:   has a past surgical history that includes Colectomy Partial (1990); hernia repair (1953); tonsillectomy & adenoidectomy; colonoscopy; NM Prostate Implant Therapy w Pd 103 Seeds (2008); REPAIR HALLUX RIGIDUS; joint replacement (Right, 02/05/2013); Cataract Extraction (Right, 10/22/2013); Cataract Extraction (Left, 11/11/2013); and Surgical Pathology Exam (Right, 02/20/2018).  FAMILY HISTORY: family history includes Asthma in his sister; Cerebrovascular Disease in his mother; Ovarian Cancer in his sister; Seizure Disorder in his sister; Substance Abuse in his father.  SOCIAL HISTORY:   reports that he quit smoking about 59 years ago. His smoking use included pipe. He does not have any smokeless tobacco history on file. He reports that he does not currently use alcohol.  Patient's living condition: lives in an assisted living facility    Post Discharge Medication Reconciliation Status:   MED REC REQUIRED  Post Medication Reconciliation Status: discharge medications reconciled, continue medications without change     Current Outpatient Medications   Medication Sig Dispense Refill     acetaminophen (TYLENOL 8 HOUR ARTHRITIS PAIN) 650 MG CR tablet Take 650 mg by mouth every 8 hours as needed for mild pain or fever        amiodarone (PACERONE) 200 MG tablet Take 200 mg by mouth daily       amLODIPine (NORVASC) 5 MG tablet Take 5 mg by mouth daily       benzonatate (TESSALON) 100 MG capsule Take 100 mg by mouth 3 times daily as needed for cough       donepezil (ARICEPT) 10 MG tablet Take 10 mg by mouth at bedtime       ipratropium - albuterol 0.5 mg/2.5 mg/3 mL (DUONEB) 0.5-2.5 (3) MG/3ML neb solution Take 1 vial by nebulization 4 times daily as needed for shortness of breath, wheezing or cough       lisinopril (ZESTRIL) 20 MG tablet Take 20 mg by mouth daily       melatonin 3 MG tablet Take 3 mg by mouth at bedtime       OLANZapine (ZYPREXA) 10 MG tablet Take 5 mg by mouth at bedtime AND 5 mg BID PRN       Selenium 200 MCG TABS tablet Take 200 mcg by mouth daily       Vitamin D3 (CHOLECALCIFEROL) 25 mcg (1000 units) tablet Take 3 tablets by mouth daily       vitamin E (TOCOPHEROL) 1000 units (450 mg) CAPS capsule Take 1,000 Units by mouth daily       zinc gluconate 50 MG tablet Take 50 mg by mouth daily       No current facility-administered medications for this visit.     ROS:  4 point ROS including Respiratory, CV, GI and , other than that noted in the HPI,  is negative    Vitals:  /68   Pulse 85   Temp 97.5  F (36.4  C)   Resp 18   Wt 80.3 kg (177 lb)   Exam:  GENERAL APPEARANCE:  Alert, in no distress  ENT:  Mouth and posterior oropharynx normal, moist mucous membranes  EYES:  EOM, conjunctivae, lids, pupils and irises normal  RESP:  respiratory effort and palpation of chest normal, lungs clear to auscultation , no respiratory distress  CV:  Palpation and auscultation of heart done , regular rate and rhythm, no murmur, rub, or gallop  ABDOMEN:  normal bowel sounds, soft, nontender, no hepatosplenomegaly or other masses  M/S:   No edema in BLE  SKIN:  Inspection of skin and subcutaneous tissue baseline, Palpation of skin and subcutaneous tissue baseline  NEURO:   Cranial nerves 2-12 are normal tested and grossly at  patient's baseline  PSYCH:  memory impaired     Lab/Diagnostic data:  Labs done in SNF are in [place] EPIC. Please refer to them using [employer] Everywhere.    ASSESSMENT/PLAN:  Alzheimer's Dementia. Started on Olanzapine during hospitalization due to agitation and sundowning. Lives with wife prior to hospitalization. Now resides on secure memory care unit. Able to make needs known. Staff to assist with ADLs, meals, and medications. Takes Donepezil.     Atrial Fibrillation with RVR. Noted during most recent hospitalization. Risks outweigh benefits of anticoagulation. Unable to tolerate Metoprolol. Started on Amiodarone. Monitor heart rate.    Hypertension. Monitor blood pressure. Continue Amlodipine and Lisinopril as ordered.    History of Pulmonary Edema. Noted during hospitalization after receiving IV fluids. Resolved with IV diuretics. Order for DuoNeb, but question if patient has nebulizer machine. Will need to follow-up at future visit. Will discontinue Benzonatate due to non-use.    History of Prostate Cancer S/P Brachytherapy 2008, History of Rectal Cancer S/P Low Anterior Resection 1990 with Positive Lymph Nodes, and History of Recurrent Melanoma. Noted in history.     Insomnia. Continue Melatonin as ordered.    Orders:  Discontinue Benzonatate    Electronically signed by:  LAKESHIA Douglass CNP                   Sincerely,        LAKESHIA Douglass CNP

## 2024-05-27 PROBLEM — I48.91 ATRIAL FIBRILLATION (H): Status: ACTIVE | Noted: 2024-05-27

## 2024-06-10 ENCOUNTER — LAB REQUISITION (OUTPATIENT)
Dept: LAB | Facility: CLINIC | Age: 89
End: 2024-06-10
Payer: MEDICARE

## 2024-06-10 DIAGNOSIS — E87.6 HYPOKALEMIA: ICD-10-CM

## 2024-06-11 LAB
ANION GAP SERPL CALCULATED.3IONS-SCNC: 13 MMOL/L (ref 7–15)
BUN SERPL-MCNC: 11.8 MG/DL (ref 8–23)
CALCIUM SERPL-MCNC: 9.1 MG/DL (ref 8.8–10.2)
CHLORIDE SERPL-SCNC: 108 MMOL/L (ref 98–107)
CREAT SERPL-MCNC: 1.01 MG/DL (ref 0.67–1.17)
DEPRECATED HCO3 PLAS-SCNC: 22 MMOL/L (ref 22–29)
EGFRCR SERPLBLD CKD-EPI 2021: 71 ML/MIN/1.73M2
GLUCOSE SERPL-MCNC: 59 MG/DL (ref 70–99)
POTASSIUM SERPL-SCNC: 4.1 MMOL/L (ref 3.4–5.3)
SODIUM SERPL-SCNC: 143 MMOL/L (ref 135–145)

## 2024-06-11 PROCEDURE — 36415 COLL VENOUS BLD VENIPUNCTURE: CPT | Mod: ORL | Performed by: NURSE PRACTITIONER

## 2024-06-11 PROCEDURE — 80048 BASIC METABOLIC PNL TOTAL CA: CPT | Mod: ORL | Performed by: NURSE PRACTITIONER

## 2024-06-11 PROCEDURE — P9604 ONE-WAY ALLOW PRORATED TRIP: HCPCS | Mod: ORL | Performed by: NURSE PRACTITIONER

## 2024-07-16 DIAGNOSIS — Z53.9 DIAGNOSIS NOT YET DEFINED: Primary | ICD-10-CM

## 2024-07-16 PROCEDURE — G0179 MD RECERTIFICATION HHA PT: HCPCS | Performed by: NURSE PRACTITIONER

## 2024-07-26 NOTE — PROGRESS NOTES
[facility] [place] [facility]   2024     Name: Reji Cortes Jr.   : 1935     Orders:  Ok for Barrier Cream to be applied BID and PRN after each bowel movement. Ok to keep at bedside. Dx: Skin Protection    Electronically signed by LAKESHIA Douglass CNP

## 2024-08-15 ENCOUNTER — ASSISTED LIVING VISIT (OUTPATIENT)
Dept: GERIATRICS | Facility: CLINIC | Age: 89
End: 2024-08-15
Payer: MEDICARE

## 2024-08-15 VITALS
TEMPERATURE: 97.2 F | DIASTOLIC BLOOD PRESSURE: 78 MMHG | WEIGHT: 167.8 LBS | RESPIRATION RATE: 18 BRPM | HEART RATE: 82 BPM | SYSTOLIC BLOOD PRESSURE: 136 MMHG

## 2024-08-15 DIAGNOSIS — R25.1 TREMOR: Primary | ICD-10-CM

## 2024-08-15 PROCEDURE — 99349 HOME/RES VST EST MOD MDM 40: CPT | Performed by: NURSE PRACTITIONER

## 2024-08-15 NOTE — PROGRESS NOTES
[facility] GERIATRICS  Chief Complaint   Patient presents with    RECHECK     HPI:  Reji Cortes Jr. is a 89 year old  (1935), who is being seen today for an episodic care visit at: [facility] ([facility]) [[id number]].     Background:    This is an 89-year-old male, with a past medical history significant for Alzheimer's Dementia, hypertension, melanoma of the back, prostate cancer, colon cancer s/p partial colectomy 1990, lymphocytic colitis s/p partial colectomy 1990, and osteoarthritis s/p right hip replacement 2/5/13, who was initially evaluated at [facility] ED with transfer to [facility] 4/5/24 through 4/19/24 for weakness after a fall. Parainfluenza positive. Labs also revealed elevated CK, Troponin, and pro-BNP. IV fluids administered. Found to have atrial fibrillation with RVR. Anticoagulation was not initiated as risk outweigh benefit. Significant wheezing and worsening cough noted after initiation of Metoprolol so this was discontinued. Became hypoxic 4/8/24 and chest x-ray revealed pulmonary edema. IV fluids discontinued. IV diurectic administered with improvement. Started on Olanzapine for agitation and sundowning. Physical rehabilitation was recommended at a TCU. Admitted to [facility] on the memory care unit 4/26/24.     Today's concern is:     Tremor. Per staff report, spouse has noticed an increase tremor. Spoke to wife, Paula, via telephone. She notes that she visited over the weekend and noticed a tremor in both legs and arms. Wonders if he's anxious or the medications are causing this. Isn't sure if there is anything that can be done. Today, patient is seen at the dining room table. Has a slight tremor in his bilateral arms.     Allergies, and PMH/PSH reviewed in EPIC today.  REVIEW OF SYSTEMS:  4 point ROS including Respiratory, CV, GI and , other than that noted in the HPI,  is negative    Objective:   /78   Pulse 82   Temp 97.2  F (36.2   C)   Resp 18   Wt 76.1 kg (167 lb 12.8 oz)   GENERAL APPEARANCE:  Alert, in no distress  ENT:  Mouth and posterior oropharynx normal, moist mucous membranes  EYES:  EOM, conjunctivae, lids, pupils and irises normal  RESP:  no respiratory distress  M/S:   Slight tremor in bilateral upper extremities  PSYCH:  memory impaired     Labs done in SNF are in [place] EPIC. Please refer to them using EPIC/Care Everywhere.    Assessment/Plan:  Tremor. With history of essential tremor dating back as far as 2008 per Epic. Mild tremor noted in bilateral upper hands and arms during visit. Able to bring fork to mouth. Discussed with wife potential etiologies such as side effect of Amiodarone or thyroid. Recommend labs. Further plans pending results.     Alzheimer's Dementia. Started on Olanzapine during hospitalization due to agitation and sundowning. Lives with wife prior to hospitalization. Now resides on secure memory care unit. Able to make needs known. Staff to assist with ADLs, meals, and medications. Takes Donepezil. Weight down since AL admission, 178.4 lbs 4/30/24 -> 167.8 lbs 8/6/24. Continue to monitor to determine goals of care and continuation of Donepezil.      Atrial Fibrillation with RVR. Noted during most recent hospitalization. Risks outweigh benefits of anticoagulation. Unable to tolerate Metoprolol. Started on Amiodarone. Heart rate WNL over the past 2 months.     Hypertension. Most blood pressures < 140/90 over the past 2 months. Continue Amlodipine and Lisinopril as ordered.     History of Pulmonary Edema. Noted during hospitalization after receiving IV fluids. Resolved with IV diuretics. Order for DuoNeb, but question if patient has nebulizer machine. Will need to follow-up at future visit. Will discontinue Benzonatate due to non-use.     History of Prostate Cancer S/P Brachytherapy 2008, History of Rectal Cancer S/P Low Anterior Resection 1990 with Positive Lymph Nodes, and History of Recurrent Melanoma.  Noted in history.      Insomnia. Continue Melatonin as ordered.     Orders:  CBC, CMP, Mg, TSH, Free T4, Vitamin D, Vitamin B12, Amiodarone (AMIO), Zinc 8/20/24 Dx: R25.1    Electronically signed by: LAKESHIA Douglass CNP

## 2024-08-15 NOTE — LETTER
8/15/2024      Reji Cortes Jr.  [facility]  [address]  [facility] [address]        [facility] GERIATRICS  Chief Complaint   Patient presents with     RECHECK     HPI:  Reji Cortes Jr. is a 89 year old  (1935), who is being seen today for an episodic care visit at: [facility] ([facility]) [[id number]].     Background:    This is an 89-year-old male, with a past medical history significant for Alzheimer's Dementia, hypertension, melanoma of the back, prostate cancer, colon cancer s/p partial colectomy 1990, lymphocytic colitis s/p partial colectomy 1990, and osteoarthritis s/p right hip replacement 2/5/13, who was initially evaluated at [facility] ED with transfer to [facility] 4/5/24 through 4/19/24 for weakness after a fall. Parainfluenza positive. Labs also revealed elevated CK, Troponin, and pro-BNP. IV fluids administered. Found to have atrial fibrillation with RVR. Anticoagulation was not initiated as risk outweigh benefit. Significant wheezing and worsening cough noted after initiation of Metoprolol so this was discontinued. Became hypoxic 4/8/24 and chest x-ray revealed pulmonary edema. IV fluids discontinued. IV diurectic administered with improvement. Started on Olanzapine for agitation and sundowning. Physical rehabilitation was recommended at a TCU. Admitted to [facility] on the memory care unit 4/26/24.     Today's concern is:     Tremor. Per staff report, spouse has noticed an increase tremor. Spoke to wife, Paula, via telephone. She notes that she visited over the weekend and noticed a tremor in both legs and arms. Wonders if he's anxious or the medications are causing this. Isn't sure if there is anything that can be done. Today, patient is seen at the dining room table. Has a slight tremor in his bilateral arms.     Allergies, and PMH/PSH reviewed in [facility] today.  REVIEW OF SYSTEMS:  4 point ROS including Respiratory, CV, GI  and , other than that noted in the HPI,  is negative    Objective:   /78   Pulse 82   Temp 97.2  F (36.2  C)   Resp 18   Wt 76.1 kg (167 lb 12.8 oz)   GENERAL APPEARANCE:  Alert, in no distress  ENT:  Mouth and posterior oropharynx normal, moist mucous membranes  EYES:  EOM, conjunctivae, lids, pupils and irises normal  RESP:  no respiratory distress  M/S:   Slight tremor in bilateral upper extremities  PSYCH:  memory impaired     Labs done in SNF are in [place] [facility]. Please refer to them using [employer]/Care Everywhere.    Assessment/Plan:  Tremor. With history of essential tremor dating back as far as 2008 per Epic. Mild tremor noted in bilateral upper hands and arms during visit. Able to bring fork to mouth. Discussed with wife potential etiologies such as side effect of Amiodarone or thyroid. Recommend labs. Further plans pending results.     Alzheimer's Dementia. Started on Olanzapine during hospitalization due to agitation and sundowning. Lives with wife prior to hospitalization. Now resides on secure memory care unit. Able to make needs known. Staff to assist with ADLs, meals, and medications. Takes Donepezil. Weight down since AL admission, 178.4 lbs 4/30/24 -> 167.8 lbs 8/6/24. Continue to monitor to determine goals of care and continuation of Donepezil.      Atrial Fibrillation with RVR. Noted during most recent hospitalization. Risks outweigh benefits of anticoagulation. Unable to tolerate Metoprolol. Started on Amiodarone. Heart rate WNL over the past 2 months.     Hypertension. Most blood pressures < 140/90 over the past 2 months. Continue Amlodipine and Lisinopril as ordered.     History of Pulmonary Edema. Noted during hospitalization after receiving IV fluids. Resolved with IV diuretics. Order for DuoNeb, but question if patient has nebulizer machine. Will need to follow-up at future visit. Will discontinue Benzonatate due to non-use.     History of Prostate Cancer S/P Brachytherapy 2008,  History of Rectal Cancer S/P Low Anterior Resection  with Positive Lymph Nodes, and History of Recurrent Melanoma. Noted in history.      Insomnia. Continue Melatonin as ordered.     Orders:  CBC, CMP, Mg, TSH, Free T4, Vitamin D, Vitamin B12, Amiodarone (AMIO), Zinc 24 Dx: R25.1    Electronically signed by: LAKESHIA Douglass CNP              [facility]   2024     Name: Reji Sharpekenneth Cortes Jr.   : 1935     Orders:  CBC, CMP, Mg, TSH, Free T4, Vitamin D, Vitamin B12, Amiodarone (AMIO), Zinc 24 Dx: R25.1    Electronically signed by LAKESHIA Douglass CNP         Sincerely,        LAKESHIA Douglass CNP

## 2024-08-16 ENCOUNTER — LAB REQUISITION (OUTPATIENT)
Dept: LAB | Facility: CLINIC | Age: 89
End: 2024-08-16
Payer: MEDICARE

## 2024-08-16 DIAGNOSIS — R25.1 TREMOR, UNSPECIFIED: ICD-10-CM

## 2024-08-16 NOTE — PROGRESS NOTES
[facility] Geriatrics   2024     Name: Reji Cortes .   : 1935     Orders:  CBC, CMP, Mg, TSH, Free T4, Vitamin D, Vitamin B12, Amiodarone (AMIO), Zinc 24 Dx: R25.1    Electronically signed by LAKESHIA Douglass CNP

## 2024-08-20 LAB
ALBUMIN SERPL BCG-MCNC: 4.3 G/DL (ref 3.5–5.2)
ALP SERPL-CCNC: 120 U/L (ref 40–150)
ALT SERPL W P-5'-P-CCNC: 17 U/L (ref 0–70)
ANION GAP SERPL CALCULATED.3IONS-SCNC: 13 MMOL/L (ref 7–15)
AST SERPL W P-5'-P-CCNC: 19 U/L (ref 0–45)
BASOPHILS # BLD AUTO: 0.1 10E3/UL (ref 0–0.2)
BASOPHILS NFR BLD AUTO: 1 %
BILIRUB SERPL-MCNC: 0.5 MG/DL
BUN SERPL-MCNC: 11.4 MG/DL (ref 8–23)
CALCIUM SERPL-MCNC: 9.3 MG/DL (ref 8.8–10.4)
CHLORIDE SERPL-SCNC: 103 MMOL/L (ref 98–107)
CREAT SERPL-MCNC: 0.96 MG/DL (ref 0.67–1.17)
EGFRCR SERPLBLD CKD-EPI 2021: 76 ML/MIN/1.73M2
EOSINOPHIL # BLD AUTO: 0.7 10E3/UL (ref 0–0.7)
EOSINOPHIL NFR BLD AUTO: 10 %
ERYTHROCYTE [DISTWIDTH] IN BLOOD BY AUTOMATED COUNT: 16.3 % (ref 10–15)
GLUCOSE SERPL-MCNC: 89 MG/DL (ref 70–99)
HCO3 SERPL-SCNC: 24 MMOL/L (ref 22–29)
HCT VFR BLD AUTO: 43.3 % (ref 40–53)
HGB BLD-MCNC: 13.9 G/DL (ref 13.3–17.7)
IMM GRANULOCYTES # BLD: 0.1 10E3/UL
IMM GRANULOCYTES NFR BLD: 1 %
LYMPHOCYTES # BLD AUTO: 1.2 10E3/UL (ref 0.8–5.3)
LYMPHOCYTES NFR BLD AUTO: 18 %
MAGNESIUM SERPL-MCNC: 2.2 MG/DL (ref 1.7–2.3)
MCH RBC QN AUTO: 30 PG (ref 26.5–33)
MCHC RBC AUTO-ENTMCNC: 32.1 G/DL (ref 31.5–36.5)
MCV RBC AUTO: 94 FL (ref 78–100)
MONOCYTES # BLD AUTO: 0.8 10E3/UL (ref 0–1.3)
MONOCYTES NFR BLD AUTO: 11 %
NEUTROPHILS # BLD AUTO: 4.1 10E3/UL (ref 1.6–8.3)
NEUTROPHILS NFR BLD AUTO: 59 %
NRBC # BLD AUTO: 0 10E3/UL
NRBC BLD AUTO-RTO: 0 /100
PLATELET # BLD AUTO: 226 10E3/UL (ref 150–450)
POTASSIUM SERPL-SCNC: 3.8 MMOL/L (ref 3.4–5.3)
PROT SERPL-MCNC: 6.8 G/DL (ref 6.4–8.3)
RBC # BLD AUTO: 4.63 10E6/UL (ref 4.4–5.9)
SODIUM SERPL-SCNC: 140 MMOL/L (ref 135–145)
T4 FREE SERPL-MCNC: 1.68 NG/DL (ref 0.9–1.7)
TSH SERPL DL<=0.005 MIU/L-ACNC: 3.75 UIU/ML (ref 0.3–4.2)
VIT B12 SERPL-MCNC: 500 PG/ML (ref 232–1245)
VIT D+METAB SERPL-MCNC: 35 NG/ML (ref 20–50)
WBC # BLD AUTO: 6.9 10E3/UL (ref 4–11)

## 2024-08-20 PROCEDURE — 82607 VITAMIN B-12: CPT | Mod: ORL | Performed by: NURSE PRACTITIONER

## 2024-08-20 PROCEDURE — 36415 COLL VENOUS BLD VENIPUNCTURE: CPT | Mod: ORL | Performed by: NURSE PRACTITIONER

## 2024-08-20 PROCEDURE — 84443 ASSAY THYROID STIM HORMONE: CPT | Mod: ORL | Performed by: NURSE PRACTITIONER

## 2024-08-20 PROCEDURE — 82306 VITAMIN D 25 HYDROXY: CPT | Mod: ORL | Performed by: NURSE PRACTITIONER

## 2024-08-20 PROCEDURE — P9603 ONE-WAY ALLOW PRORATED MILES: HCPCS | Mod: ORL | Performed by: NURSE PRACTITIONER

## 2024-08-20 PROCEDURE — 84439 ASSAY OF FREE THYROXINE: CPT | Mod: ORL | Performed by: NURSE PRACTITIONER

## 2024-08-20 PROCEDURE — 80053 COMPREHEN METABOLIC PANEL: CPT | Mod: ORL | Performed by: NURSE PRACTITIONER

## 2024-08-20 PROCEDURE — 85025 COMPLETE CBC W/AUTO DIFF WBC: CPT | Mod: ORL | Performed by: NURSE PRACTITIONER

## 2024-08-20 PROCEDURE — 83735 ASSAY OF MAGNESIUM: CPT | Mod: ORL | Performed by: NURSE PRACTITIONER

## 2024-08-21 PROBLEM — C43.59 MELANOMA OF BACK (H): Status: RESOLVED | Noted: 2024-08-21 | Resolved: 2024-08-21

## 2024-08-21 PROBLEM — C43.59 MELANOMA OF BACK (H): Status: ACTIVE | Noted: 2024-08-21

## 2024-08-22 ENCOUNTER — TELEPHONE (OUTPATIENT)
Dept: GERIATRICS | Facility: CLINIC | Age: 89
End: 2024-08-22
Payer: MEDICARE

## 2024-08-22 NOTE — TELEPHONE ENCOUNTER
[facility] Geriatrics   2024     Name: Reji Cortes Jr.   : 1935     Left voicemail for wife re: normal lab results. Awaiting Amiodarone result to be re-drawn next week.    Electronically signed by LAKESHIA Douglass CNP

## 2024-09-18 ENCOUNTER — ASSISTED LIVING VISIT (OUTPATIENT)
Dept: GERIATRICS | Facility: CLINIC | Age: 89
End: 2024-09-18
Payer: MEDICARE

## 2024-09-18 VITALS
SYSTOLIC BLOOD PRESSURE: 156 MMHG | DIASTOLIC BLOOD PRESSURE: 90 MMHG | WEIGHT: 153.8 LBS | RESPIRATION RATE: 18 BRPM | TEMPERATURE: 98.1 F | HEART RATE: 86 BPM

## 2024-09-18 DIAGNOSIS — R25.1 TREMOR: Primary | ICD-10-CM

## 2024-09-18 PROCEDURE — 99349 HOME/RES VST EST MOD MDM 40: CPT | Performed by: NURSE PRACTITIONER

## 2024-09-18 NOTE — LETTER
9/18/2024      Reji [facility]  [address]  [facility] [id number]        No notes on file      Sincerely,        ALKESHIA Douglass CNP

## 2024-09-18 NOTE — PROGRESS NOTES
[facility] GERIATRICS  Chief Complaint   Patient presents with    RECHECK     HPI:  Reji Cortes Jr. is a 89 year old  (1935), who is being seen today for an episodic care visit at: [facility] ([facility]) [[id number]].     Background:    This is an 89-year-old male, with a past medical history significant for Alzheimer's Dementia, hypertension, melanoma of the back, prostate cancer, colon cancer s/p partial colectomy 1990, lymphocytic colitis s/p partial colectomy 1990, and osteoarthritis s/p right hip replacement 2/5/13, who was initially evaluated at [facility] ED with transfer to [facility] 4/5/24 through 4/19/24 for weakness after a fall. Parainfluenza positive. Labs also revealed elevated CK, Troponin, and pro-BNP. IV fluids administered. Found to have atrial fibrillation with RVR. Anticoagulation was not initiated as risk outweigh benefit. Significant wheezing and worsening cough noted after initiation of Metoprolol so this was discontinued. Became hypoxic 4/8/24 and chest x-ray revealed pulmonary edema. IV fluids discontinued. IV diurectic administered with improvement. Started on Olanzapine for agitation and sundowning. Physical rehabilitation was recommended at a TCU. Admitted to [facility] on the memory care unit 4/26/24.      Today's concern is:     Tremor. Reports of tremor noted by staff and patient seen 8/15/24. Labs ordered and unremarkable. Unable to obtain Amiodarone lab as it needs to be returned to lab within 2 hours. Staff continues to notice tremor, mostly in the upper extremities. Has been in the lower extremities. Worked with therapy recently and had a great session without tremors.      Today, patient is sitting at dining room table. Is able to bring fork to mouth with mild tremor in LUE.    Allergies, and PMH/PSH reviewed in [facility] today.  REVIEW OF SYSTEMS:  Limited secondary to cognitive impairment but today pt reports no concerns.    Objective:    BP (!) 156/90   Pulse 86   Temp 98.1  F (36.7  C)   Resp 18   Wt 69.8 kg (153 lb 12.8 oz)   GENERAL APPEARANCE:  Alert, in no distress  M/S:   Slight tremor noted in BUE  PSYCH:  memory impaired     Labs done in SNF are in [place]. Please refer to them using EPIC/Care Everywhere.    Assessment/Plan:  Tremor. With history of essential tremor dating back as far as 2008 per Epic. Mild tremor noted in bilateral upper hands and arms during visit. Discussed with wife Neurology referral and imaging versus medication adjustment. Wife would prefer not to take patient out for appointment and have work-up. Will decrease Amiodarone to 100 mg as this could be contributing although unable to check a lab level in this setting. Will also decrease Olanzapine as this may be contributing. Further plans pending results.      Alzheimer's Dementia. Started on Olanzapine during hospitalization due to agitation and sundowning. Will decrease dose as noted above as may be contributing to tremor. Lived with wife prior to hospitalization. Now resides on secure memory care unit. Able to make needs known. Staff to assist with ADLs, meals, and medications. Takes Donepezil. Weight down since AL admission, 178.4 lbs 4/30/24 -> 167.8 lbs 8/6/24 -> 162.8 lbs 9/6/24.     Atrial Fibrillation with RVR. Noted during most recent hospitalization. Risks outweigh benefits of anticoagulation. Unable to tolerate Metoprolol. Started on Amiodarone. Heart rate WNL over the past 2 months. Will decrease Amiodarone as noted above.     Hypertension. Most blood pressures < 140/90 over the past 2 months. Continue Amlodipine and Lisinopril as ordered.     History of Pulmonary Edema. Noted during hospitalization after receiving IV fluids. Resolved with IV diuretics. Order for DuoNeb, but question if patient has nebulizer machine. Will need to follow-up at future visit.      History of Prostate Cancer S/P Brachytherapy 2008, History of Rectal Cancer S/P Low  Anterior Resection 1990 with Positive Lymph Nodes, and History of Recurrent Melanoma. Noted in history.      Insomnia. Continue Melatonin as ordered.    Orders:  Decrease Amiodarone to 100 mg PO every day  Decrease Olanzapine to 2.5 mg PO QHS    Electronically signed by: LAKESHIA Douglass CNP

## 2024-09-23 RX ORDER — AMIODARONE HYDROCHLORIDE 100 MG/1
100 TABLET ORAL DAILY
COMMUNITY
Start: 2024-09-23 | End: 2024-09-26

## 2024-09-23 RX ORDER — OLANZAPINE 2.5 MG/1
2.5 TABLET, FILM COATED ORAL AT BEDTIME
COMMUNITY
Start: 2024-09-23 | End: 2024-09-26

## 2024-09-23 NOTE — PROGRESS NOTES
[facility] Geriatrics   2024     Name: Reji Cortes    : 1935     Orders:  Decrease Amiodarone to 100 mg PO every day Dx: A. Fib  Decrease Olanzapine to 2.5 mg at bedtime and 2.5 mg PO BID PRN Dx: Alzheimer's Dementia    Electronically signed by LAKESHIA Douglass CNP

## 2024-09-25 DIAGNOSIS — F02.80 ALZHEIMER'S DISEASE (H): ICD-10-CM

## 2024-09-25 DIAGNOSIS — G30.9 ALZHEIMER'S DISEASE (H): ICD-10-CM

## 2024-09-25 DIAGNOSIS — I48.91 ATRIAL FIBRILLATION, UNSPECIFIED TYPE (H): Primary | ICD-10-CM

## 2024-09-26 RX ORDER — OLANZAPINE 2.5 MG/1
TABLET, FILM COATED ORAL
Qty: 30 TABLET | Refills: 11 | Status: SHIPPED | OUTPATIENT
Start: 2024-09-26

## 2024-09-26 RX ORDER — AMIODARONE HYDROCHLORIDE 100 MG/1
TABLET ORAL
Qty: 30 TABLET | Refills: 11 | Status: SHIPPED | OUTPATIENT
Start: 2024-09-26

## 2024-09-30 DIAGNOSIS — R23.8 SKIN IRRITATION: Primary | ICD-10-CM

## 2024-10-01 RX ORDER — AD TREAT DIAPER RASH CREAM WITH DIMETHICONE AND ZINC OXIDE 1; 10 G/100G; G/100G
CREAM TOPICAL
Qty: 113 G | Refills: 11 | Status: SHIPPED | OUTPATIENT
Start: 2024-10-01

## 2024-10-23 ENCOUNTER — ASSISTED LIVING VISIT (OUTPATIENT)
Dept: GERIATRICS | Facility: CLINIC | Age: 89
End: 2024-10-23
Payer: MEDICARE

## 2024-10-23 VITALS
RESPIRATION RATE: 16 BRPM | DIASTOLIC BLOOD PRESSURE: 82 MMHG | HEART RATE: 102 BPM | SYSTOLIC BLOOD PRESSURE: 126 MMHG | TEMPERATURE: 97 F | WEIGHT: 170.2 LBS

## 2024-10-23 DIAGNOSIS — R63.4 WEIGHT LOSS: ICD-10-CM

## 2024-10-23 DIAGNOSIS — L98.9 SKIN LESION: Primary | ICD-10-CM

## 2024-10-23 DIAGNOSIS — I48.91 ATRIAL FIBRILLATION, UNSPECIFIED TYPE (H): ICD-10-CM

## 2024-10-23 DIAGNOSIS — Z85.820 HISTORY OF MELANOMA: ICD-10-CM

## 2024-10-23 PROCEDURE — 99349 HOME/RES VST EST MOD MDM 40: CPT | Performed by: NURSE PRACTITIONER

## 2024-10-23 NOTE — LETTER
10/23/2024      Reji Cortes Jr.  [facility]  [address]  [facility] [address]        [facility] GERIATRICS  Chief Complaint   Patient presents with     RECHECK     HPI:  Reji Cortes Jr. is a 89 year old  (1935), who is being seen today for an episodic care visit at: [facility] ([facility]) [[id number]].     Background:    This is an 89-year-old male, with a past medical history significant for Alzheimer's Dementia, hypertension, melanoma of the back, prostate cancer, colon cancer s/p partial colectomy 1990, lymphocytic colitis s/p partial colectomy 1990, and osteoarthritis s/p right hip replacement 2/5/13, who was initially evaluated at [facility] ED with transfer to [facility] 4/5/24 through 4/19/24 for weakness after a fall. Parainfluenza positive. Labs also revealed elevated CK, Troponin, and pro-BNP. IV fluids administered. Found to have atrial fibrillation with RVR. Anticoagulation was not initiated as risk outweigh benefit. Significant wheezing and worsening cough noted after initiation of Metoprolol so this was discontinued. Became hypoxic 4/8/24 and chest x-ray revealed pulmonary edema. IV fluids discontinued. IV diurectic administered with improvement. Started on Olanzapine for agitation and sundowning. Physical rehabilitation was recommended at a TCU. Admitted to [facility] on the memory care unit 4/26/24.     Today's concern is:     Skin Lesion. Staff reports hypergranulation wound on patient's back. Bleeding. Wife recent took out to Dermatologist. Today, patient is sitting in his wheelchair in the common area. Has no concerns.     Weight Loss. Upon review of documentation, weights 178.4 lbs 4/30/24 -> 170.2 lbs 10/6/24.     Atrial Fibrillation. Amiodarone decreased 9/18/24. Upon review of heart rate since that time, has had 2 readings of 62 and 102.    Allergies, and PMH/PSH reviewed in EPIC today.  REVIEW OF SYSTEMS:  Limited  secondary to cognitive impairment but today pt reports no concerns    Objective:   /82   Pulse 102   Temp 97  F (36.1  C)   Resp 16   Wt 77.2 kg (170 lb 3.2 oz)   GENERAL APPEARANCE:  Alert, in no distress  SKIN:  Large, raised, beefy red circular lesion with bleeding present on mid back.  PSYCH:  memory impaired     [facility]. Please refer to them using [facility]/Care Everywhere.    Assessment/Plan:  Skin Lesion with History of Recurrent Melanoma. Melanoma previously excised from right mid back. Lesion concerning for cancer given appearance and bleeding. Reached out to wife to ensure Dermatology is following. Left voicemail and awaiting return phone call. Continue dressing change as ordered for now.    Tremor. Mild tremor in bilateral upper extremities noted. History of essential tremor dating back as far as 2008 per Epic. Wife previously declined Neurology referral. Decreased Amiodarone to 100 mg and Olanzapine 9/18/24 as possible contributing factors. Overall goal is comfort.     Alzheimer's Dementia. Started on Olanzapine during hospitalization due to agitation and sundowning with dose decrease as noted above. Given no report of recent behavior, will discuss with wife discontinuation. Resides on secure memory care unit. Able to make needs known. Staff to assist with ADLs, meals, and medications. Takes Donepezil.     Weight Loss. Overall mild since April 2024. Continue to monitor to determine if Donepezil should be discontinued and/or hospice discussed. Overall goal is comfort.     Atrial Fibrillation with RVR. Noted during most recent hospitalization. Risks outweigh benefits of anticoagulation. Unable to tolerate Metoprolol. Amiodarone decreased 9/18/24. Limited heart rates available to review, but close to normal limits.     Hypertension. Most blood pressures < 140/90 over the past 2 months. Continue Amlodipine and Lisinopril as ordered.     History of Prostate Cancer S/P  Brachytherapy 2008, History of Rectal Cancer S/P Low Anterior Resection 1990 with Positive Lymph Nodes, and History of Recurrent Melanoma. Noted in history.      Insomnia. Continue Melatonin as ordered.    Orders:  None - attempting to contact wife.    Electronically signed by: LAKESHIA Douglass CNP         Sincerely,        LAKESHIA Douglass CNP

## 2024-10-23 NOTE — PROGRESS NOTES
[facility] GERIATRICS  Chief Complaint   Patient presents with    RECHECK     HPI:  Reji Cortes Jr. is a 89 year old  (1935), who is being seen today for an episodic care visit at: [facility] ([facility]) [[id number]].     Background:    This is an 89-year-old male, with a past medical history significant for Alzheimer's Dementia, hypertension, melanoma of the back, prostate cancer, colon cancer s/p partial colectomy 1990, lymphocytic colitis s/p partial colectomy 1990, and osteoarthritis s/p right hip replacement 2/5/13, who was initially evaluated at [facility] ED with transfer to [facility] 4/5/24 through 4/19/24 for weakness after a fall. Parainfluenza positive. Labs also revealed elevated CK, Troponin, and pro-BNP. IV fluids administered. Found to have atrial fibrillation with RVR. Anticoagulation was not initiated as risk outweigh benefit. Significant wheezing and worsening cough noted after initiation of Metoprolol so this was discontinued. Became hypoxic 4/8/24 and chest x-ray revealed pulmonary edema. IV fluids discontinued. IV diurectic administered with improvement. Started on Olanzapine for agitation and sundowning. Physical rehabilitation was recommended at a TCU. Admitted to [facility] on the memory care unit 4/26/24.     Today's concern is:     Skin Lesion. Staff reports hypergranulation wound on patient's back. Bleeding. Wife recent took out to Dermatologist. Today, patient is sitting in his wheelchair in the common area. Has no concerns.     Weight Loss. Upon review of documentation, weights 178.4 lbs 4/30/24 -> 170.2 lbs 10/6/24.     Atrial Fibrillation. Amiodarone decreased 9/18/24. Upon review of heart rate since that time, has had 2 readings of 62 and 102.    Allergies, and PMH/PSH reviewed in EPIC today.  REVIEW OF SYSTEMS:  Limited secondary to cognitive impairment but today pt reports no concerns    Objective:   /82   Pulse 102   Temp 97   F (36.1  C)   Resp 16   Wt 77.2 kg (170 lb 3.2 oz)   GENERAL APPEARANCE:  Alert, in no distress  SKIN:  Large, raised, beefy red circular lesion with bleeding present on mid back.  PSYCH:  memory impaired     [facility]. Please refer to them using [employer]/Care Everywhere.    Assessment/Plan:  Skin Lesion with History of Recurrent Melanoma. Melanoma previously excised from right mid back. Lesion concerning for cancer given appearance and bleeding. Reached out to wife to ensure Dermatology is following. Left voicemail and awaiting return phone call. Continue dressing change as ordered for now.    Tremor. Mild tremor in bilateral upper extremities noted. History of essential tremor dating back as far as 2008 per Epic. Wife previously declined Neurology referral. Decreased Amiodarone to 100 mg and Olanzapine 9/18/24 as possible contributing factors. Overall goal is comfort.     Alzheimer's Dementia. Started on Olanzapine during hospitalization due to agitation and sundowning with dose decrease as noted above. Given no report of recent behavior, will discuss with wife discontinuation. Now leans to right in wheelchair. Resides on secure memory care unit. Able to make needs known. Staff to assist with ADLs, meals, and medications. Takes Donepezil.     Weight Loss. Overall mild since April 2024. Continue to monitor to determine if Donepezil should be discontinued and/or hospice discussed. Overall goal is comfort.     Atrial Fibrillation with RVR. Noted during most recent hospitalization. Risks outweigh benefits of anticoagulation. Unable to tolerate Metoprolol. Amiodarone decreased 9/18/24. Limited heart rates available to review, but close to normal limits.     Hypertension. Most blood pressures < 140/90 over the past 2 months. Continue Amlodipine and Lisinopril as ordered.     History of Prostate Cancer S/P Brachytherapy 2008, History of Rectal Cancer S/P Low Anterior Resection 1990 with  Positive Lymph Nodes, and History of Recurrent Melanoma. Noted in history.      Insomnia. Continue Melatonin as ordered.    Orders:  None - attempting to contact wife.    Electronically signed by: LAKESHIA Douglass CNP

## 2024-11-05 ENCOUNTER — ASSISTED LIVING VISIT (OUTPATIENT)
Dept: GERIATRICS | Facility: CLINIC | Age: 89
End: 2024-11-05
Payer: MEDICARE

## 2024-11-05 DIAGNOSIS — G30.9 ALZHEIMER'S DISEASE (H): ICD-10-CM

## 2024-11-05 DIAGNOSIS — R25.1 TREMOR: ICD-10-CM

## 2024-11-05 DIAGNOSIS — G30.1 LATE ONSET ALZHEIMER'S DEMENTIA WITH AGITATION, UNSPECIFIED DEMENTIA SEVERITY (H): ICD-10-CM

## 2024-11-05 DIAGNOSIS — F02.811 LATE ONSET ALZHEIMER'S DEMENTIA WITH AGITATION, UNSPECIFIED DEMENTIA SEVERITY (H): ICD-10-CM

## 2024-11-05 DIAGNOSIS — L98.9 SKIN LESION: Primary | ICD-10-CM

## 2024-11-05 DIAGNOSIS — F02.80 ALZHEIMER'S DISEASE (H): ICD-10-CM

## 2024-11-05 PROCEDURE — 99349 HOME/RES VST EST MOD MDM 40: CPT | Performed by: NURSE PRACTITIONER

## 2024-11-05 NOTE — LETTER
11/5/2024      Reji Cortes Jr.  [facility]  [address]  [facility] [address]        [facility] GERIATRICS  Chief Complaint   Patient presents with     RECHECK     HPI:  Reji Cortes Jr. is a 89 year old  (1935), who is being seen today for an episodic care visit at: [facility] ([facility]) [[id number]].     Background:    This is an 89-year-old male, with a past medical history significant for Alzheimer's Dementia, hypertension, melanoma of the back, prostate cancer, colon cancer s/p partial colectomy 1990, lymphocytic colitis s/p partial colectomy 1990, and osteoarthritis s/p right hip replacement 2/5/13, who was initially evaluated at [facility] ED with transfer to [facility] 4/5/24 through 4/19/24 for weakness after a fall. Parainfluenza positive. Labs also revealed elevated CK, Troponin, and pro-BNP. IV fluids administered. Found to have atrial fibrillation with RVR. Anticoagulation was not initiated as risk outweigh benefit. Significant wheezing and worsening cough noted after initiation of Metoprolol so this was discontinued. Became hypoxic 4/8/24 and chest x-ray revealed pulmonary edema. IV fluids discontinued. IV diurectic administered with improvement. Started on Olanzapine for agitation and sundowning. Physical rehabilitation was recommended at a TCU. Admitted to [facility] on the memory care unit 4/26/24.      Today's concern is:     Skin Lesion with History of Recurrent Melanoma. Today, patient's wife is present. Questions what can be done about the growth on his back. States she did not take him out to a Dermatologist. States taking him out for appointments is difficult and she would like to avoid this.    Tremor. Wife questions shaking in arms. Also notes legs at times. Questions if it's Parkinson's disease. Told by a previous provider this is not what it is.Has not noticed a difference since medication were adjusted  9/18/24.    Allergies, and PMH/PSH reviewed in EPIC today.  REVIEW OF SYSTEMS:  Limited secondary to cognitive impairment but today pt reports no concerns    Objective:   /63   Pulse 62   Temp 97.4  F (36.3  C)   Resp 20   Wt 77.2 kg (170 lb 3.2 oz)   GENERAL APPEARANCE:  Alert, in no distress  M/S:   Slight tremor noted in BUE  PSYCH:  memory impaired     [facility]. Please refer to them using [employer]/Care Everywhere.    Assessment/Plan:  Skin Lesion with History of Recurrent Melanoma. Melanoma previously excised from right mid back. Lesion concerning for cancer given appearance and bleeding. Discussed Dermatology referral versus staying at facility with goal of comfort. Wife prefers not to take out to Dermatology. Understands risks. Will discuss with family hospice referral. Continue dressing changes as ordered.    Tremor. Mild tremor in bilateral upper extremities noted. History of essential tremor dating back as far as 2008 per Epic. Wife previously declined Neurology referral. Decreased Amiodarone to 100 mg and Olanzapine 9/18/24 as possible contributing factors. Given no behaviors, wife in agreement with discontinuation of Olanzapine scheduled as it may be contributing. Overall goal is comfort.     Alzheimer's Dementia. Started on Olanzapine during hospitalization due to agitation and sundowning with dose decrease as noted above. Given no report of recent behavior, will discontinue scheduled as noted above. Now leans to right in wheelchair. Resides on secure memory care unit. Able to make needs known. Staff to assist with ADLs, meals, and medications. Takes Donepezil.      Weight Loss. Down ~ 9 lbs since April 2024. Continue to monitor to determine if Donepezil should be discontinued and/or hospice discussed. Overall goal is comfort.     Atrial Fibrillation with RVR. Noted during most recent hospitalization. Risks outweigh benefits of anticoagulation. Unable to tolerate  Metoprolol. Amiodarone decreased 9/18/24. Heart rate WNL.      Hypertension. Most blood pressures < 140/90 over the past 2 months. Continue Amlodipine and Lisinopril as ordered.     History of Prostate Cancer S/P Brachytherapy 2008, History of Rectal Cancer S/P Low Anterior Resection 1990 with Positive Lymph Nodes, and History of Recurrent Melanoma. Noted in history.      Insomnia. Continue Melatonin as ordered.    Orders:  Discontinue Olanzapine scheduled. Ok to keep Olanzapine PRN    Electronically signed by: LAKESHIA Douglass CNP         Sincerely,        LAKESHIA Douglass CNP

## 2024-11-06 VITALS
HEART RATE: 62 BPM | DIASTOLIC BLOOD PRESSURE: 63 MMHG | TEMPERATURE: 97.4 F | SYSTOLIC BLOOD PRESSURE: 111 MMHG | WEIGHT: 170.2 LBS | RESPIRATION RATE: 20 BRPM

## 2024-11-06 NOTE — PROGRESS NOTES
[facility] GERIATRICS  Chief Complaint   Patient presents with    RECHECK     HPI:  Reji Cortes Jr. is a 89 year old  (1935), who is being seen today for an episodic care visit at: [facility] ([facility]) [[id number]].     Background:    This is an 89-year-old male, with a past medical history significant for Alzheimer's Dementia, hypertension, melanoma of the back, prostate cancer, colon cancer s/p partial colectomy 1990, lymphocytic colitis s/p partial colectomy 1990, and osteoarthritis s/p right hip replacement 2/5/13, who was initially evaluated at [facility] ED with transfer to [facility] 4/5/24 through 4/19/24 for weakness after a fall. Parainfluenza positive. Labs also revealed elevated CK, Troponin, and pro-BNP. IV fluids administered. Found to have atrial fibrillation with RVR. Anticoagulation was not initiated as risk outweigh benefit. Significant wheezing and worsening cough noted after initiation of Metoprolol so this was discontinued. Became hypoxic 4/8/24 and chest x-ray revealed pulmonary edema. IV fluids discontinued. IV diurectic administered with improvement. Started on Olanzapine for agitation and sundowning. Physical rehabilitation was recommended at a TCU. Admitted to [facility] on the memory care unit 4/26/24.      Today's concern is:     Skin Lesion with History of Recurrent Melanoma. Today, patient's wife is present. Questions what can be done about the growth on his back. States she did not take him out to a Dermatologist. States taking him out for appointments is difficult and she would like to avoid this.    Tremor. Wife questions shaking in arms. Also notes legs at times. Questions if it's Parkinson's disease. Told by a previous provider this is not what it is.Has not noticed a difference since medication were adjusted 9/18/24.    Allergies, and PMH/PSH reviewed in EPIC today.  REVIEW OF SYSTEMS:  Limited secondary to cognitive impairment but  today pt reports no concerns    Objective:   /63   Pulse 62   Temp 97.4  F (36.3  C)   Resp 20   Wt 77.2 kg (170 lb 3.2 oz)   GENERAL APPEARANCE:  Alert, in no distress  M/S:   Slight tremor noted in BUE  PSYCH:  memory impaired     [facility]. Please refer to them using [facility] Everywhere.    Assessment/Plan:  Skin Lesion with History of Recurrent Melanoma. Melanoma previously excised from right mid back. Lesion concerning for cancer given appearance and bleeding. Discussed Dermatology referral versus staying at facility with goal of comfort. Wife prefers not to take out to Dermatology. Understands risks. Will discuss with family hospice referral. Continue dressing changes as ordered.    Tremor. Mild tremor in bilateral upper extremities noted. History of essential tremor dating back as far as 2008 per Epic. Wife previously declined Neurology referral. Decreased Amiodarone to 100 mg and Olanzapine 9/18/24 as possible contributing factors. Given no behaviors, wife in agreement with discontinuation of Olanzapine scheduled as it may be contributing. Overall goal is comfort.     Alzheimer's Dementia. Started on Olanzapine during hospitalization due to agitation and sundowning with dose decrease as noted above. Given no report of recent behavior, will discontinue scheduled as noted above. Now leans to right in wheelchair. Resides on secure memory care unit. Able to make needs known. Staff to assist with ADLs, meals, and medications. Takes Donepezil.      Weight Loss. Down ~ 9 lbs since April 2024. Continue to monitor to determine if Donepezil should be discontinued and/or hospice discussed. Overall goal is comfort.     Atrial Fibrillation with RVR. Noted during most recent hospitalization. Risks outweigh benefits of anticoagulation. Unable to tolerate Metoprolol. Amiodarone decreased 9/18/24. Heart rate WNL.      Hypertension. Most blood pressures < 140/90 over the past 2 months.  Continue Amlodipine and Lisinopril as ordered.     History of Prostate Cancer S/P Brachytherapy 2008, History of Rectal Cancer S/P Low Anterior Resection 1990 with Positive Lymph Nodes, and History of Recurrent Melanoma. Noted in history.      Insomnia. Continue Melatonin as ordered.    Orders:  Discontinue Olanzapine scheduled. Ok to keep Olanzapine PRN    Electronically signed by: LAKESHIA Douglass CNP

## 2024-11-13 RX ORDER — OLANZAPINE 2.5 MG/1
2.5 TABLET, FILM COATED ORAL DAILY PRN
COMMUNITY
Start: 2024-11-13

## 2024-11-20 DIAGNOSIS — G30.9 ALZHEIMER'S DISEASE (H): Primary | ICD-10-CM

## 2024-11-20 DIAGNOSIS — F02.80 ALZHEIMER'S DISEASE (H): Primary | ICD-10-CM

## 2024-11-20 RX ORDER — OLANZAPINE 5 MG/1
TABLET ORAL
Qty: 30 TABLET | Refills: 11 | Status: SHIPPED | OUTPATIENT
Start: 2024-11-20

## 2024-11-21 ENCOUNTER — TELEPHONE (OUTPATIENT)
Dept: GERIATRICS | Facility: CLINIC | Age: 89
End: 2024-11-21
Payer: MEDICARE

## 2024-11-22 NOTE — TELEPHONE ENCOUNTER
[facility] Geriatrics   2024     Name: Reji Cortes .   : 1935     Background:  Family reached out to [facility]. Interested in enrollment    Orders:  Ok for [facility] to evaluate and treat due to dementia    Electronically signed by LAKESHIA Douglsas CNP

## 2024-11-25 ENCOUNTER — MEDICAL CORRESPONDENCE (OUTPATIENT)
Dept: HEALTH INFORMATION MANAGEMENT | Facility: CLINIC | Age: 89
End: 2024-11-25
Payer: MEDICARE

## 2025-04-03 DIAGNOSIS — G47.00 INSOMNIA: Primary | ICD-10-CM

## 2025-04-15 DIAGNOSIS — I48.91 ATRIAL FIBRILLATION, UNSPECIFIED TYPE (H): Primary | ICD-10-CM

## 2025-04-15 DIAGNOSIS — I10 HTN (HYPERTENSION): ICD-10-CM

## 2025-04-15 DIAGNOSIS — R52 PAIN: ICD-10-CM

## 2025-04-15 DIAGNOSIS — R06.02 SOB (SHORTNESS OF BREATH): ICD-10-CM

## 2025-04-16 RX ORDER — SENNOSIDES 8.6 MG
CAPSULE ORAL
Qty: 90 TABLET | Refills: 11 | Status: SHIPPED | OUTPATIENT
Start: 2025-04-16

## 2025-04-16 RX ORDER — AMLODIPINE BESYLATE 5 MG/1
5 TABLET ORAL DAILY
Qty: 30 TABLET | Refills: 11 | Status: SHIPPED | OUTPATIENT
Start: 2025-04-16

## 2025-04-16 RX ORDER — IPRATROPIUM BROMIDE AND ALBUTEROL SULFATE 2.5; .5 MG/3ML; MG/3ML
SOLUTION RESPIRATORY (INHALATION)
Qty: 3 ML | Refills: 11 | Status: SHIPPED | OUTPATIENT
Start: 2025-04-16

## 2025-04-16 RX ORDER — LISINOPRIL 20 MG/1
20 TABLET ORAL DAILY
Qty: 30 TABLET | Refills: 11 | Status: SHIPPED | OUTPATIENT
Start: 2025-04-16

## 2025-06-25 DIAGNOSIS — I48.91 ATRIAL FIBRILLATION, UNSPECIFIED TYPE (H): Primary | ICD-10-CM

## 2025-06-25 RX ORDER — AMLODIPINE BESYLATE 2.5 MG/1
TABLET ORAL
Qty: 30 TABLET | Refills: 0 | Status: SHIPPED | OUTPATIENT
Start: 2025-06-25

## 2025-07-16 DIAGNOSIS — F03.918 SENILE DEMENTIA, WITH BEHAVIORAL DISTURBANCE (H): Primary | ICD-10-CM

## 2025-07-16 DIAGNOSIS — I48.91 ATRIAL FIBRILLATION, UNSPECIFIED TYPE (H): ICD-10-CM

## 2025-07-16 RX ORDER — RISPERIDONE 1 MG/1
1 TABLET ORAL DAILY
COMMUNITY
End: 2025-07-16

## 2025-07-17 RX ORDER — AMLODIPINE BESYLATE 2.5 MG/1
2.5 TABLET ORAL DAILY
Qty: 30 TABLET | Refills: 11 | Status: SHIPPED | OUTPATIENT
Start: 2025-07-17

## 2025-07-17 RX ORDER — RISPERIDONE 1 MG/1
1 TABLET ORAL DAILY
Qty: 30 TABLET | Refills: 11 | Status: SHIPPED | OUTPATIENT
Start: 2025-07-17

## 2025-08-14 DIAGNOSIS — I48.91 ATRIAL FIBRILLATION, UNSPECIFIED TYPE (H): ICD-10-CM

## 2025-08-14 RX ORDER — AMIODARONE HYDROCHLORIDE 100 MG/1
TABLET ORAL
Qty: 30 TABLET | Status: SHIPPED | OUTPATIENT
Start: 2025-08-14

## 2025-08-30 ENCOUNTER — TELEPHONE (OUTPATIENT)
Dept: GERIATRICS | Facility: CLINIC | Age: OVER 89
End: 2025-08-30
Payer: MEDICARE